# Patient Record
Sex: MALE | Race: WHITE | Employment: OTHER | URBAN - METROPOLITAN AREA
[De-identification: names, ages, dates, MRNs, and addresses within clinical notes are randomized per-mention and may not be internally consistent; named-entity substitution may affect disease eponyms.]

---

## 2019-07-09 ENCOUNTER — HOSPITAL ENCOUNTER (OUTPATIENT)
Age: 84
Setting detail: OBSERVATION
Discharge: HOME | End: 2019-07-10
Attending: EMERGENCY MEDICINE | Admitting: INTERNAL MEDICINE
Payer: COMMERCIAL

## 2019-07-09 ENCOUNTER — APPOINTMENT (OUTPATIENT)
Dept: GENERAL RADIOLOGY | Age: 84
End: 2019-07-09
Payer: COMMERCIAL

## 2019-07-09 ENCOUNTER — APPOINTMENT (OUTPATIENT)
Dept: CT IMAGING | Age: 84
End: 2019-07-09
Payer: COMMERCIAL

## 2019-07-09 DIAGNOSIS — S70.01XA CONTUSION OF RIGHT HIP, INITIAL ENCOUNTER: ICD-10-CM

## 2019-07-09 DIAGNOSIS — R26.81 GAIT INSTABILITY: ICD-10-CM

## 2019-07-09 DIAGNOSIS — R03.0 ELEVATED BLOOD PRESSURE READING: ICD-10-CM
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
## 2019-07-09 PROBLEM — R54 AGE-RELATED PHYSICAL DEBILITY: Status: ACTIVE | Noted: 2019-07-09

## 2019-07-09 LAB
A/G RATIO: 1.5 (ref 1.1–2.2)
ALBUMIN SERPL-MCNC: 4.5 G/DL (ref 3.4–5)
ALP BLD-CCNC: 85 U/L (ref 40–129)
ALT SERPL-CCNC: 13 U/L (ref 10–40)
ANION GAP SERPL CALCULATED.3IONS-SCNC: 11 MMOL/L (ref 3–16)
APTT: 29.1 SEC (ref 26–36)
AST SERPL-CCNC: 17 U/L (ref 15–37)
BASOPHILS ABSOLUTE: 0 K/UL (ref 0–0.2)
BASOPHILS RELATIVE PERCENT: 0.6 %
BILIRUB SERPL-MCNC: 0.7 MG/DL (ref 0–1)
BILIRUBIN URINE: NEGATIVE
BLOOD, URINE: NEGATIVE
BUN BLDV-MCNC: 15 MG/DL (ref 7–20)
CALCIUM SERPL-MCNC: 9.8 MG/DL (ref 8.3–10.6)
CHLORIDE BLD-SCNC: 103 MMOL/L (ref 99–110)
CLARITY: CLEAR
CO2: 27 MMOL/L (ref 21–32)
COLOR: YELLOW
CREAT SERPL-MCNC: 1.1 MG/DL (ref 0.8–1.3)
EKG ATRIAL RATE: 64 BPM
EKG DIAGNOSIS: NORMAL
EKG P AXIS: 76 DEGREES
EKG P-R INTERVAL: 250 MS
EKG Q-T INTERVAL: 406 MS
EKG QRS DURATION: 96 MS
EKG QTC CALCULATION (BAZETT): 418 MS
EKG R AXIS: 34 DEGREES
EKG T AXIS: 62 DEGREES
EKG VENTRICULAR RATE: 64 BPM
EOSINOPHILS ABSOLUTE: 0.2 K/UL (ref 0–0.6)
EOSINOPHILS RELATIVE PERCENT: 2.8 %
GFR AFRICAN AMERICAN: >60
GFR NON-AFRICAN AMERICAN: >60
GLOBULIN: 3 G/DL
GLUCOSE BLD-MCNC: 135 MG/DL (ref 70–99)
GLUCOSE URINE: NEGATIVE MG/DL
HCT VFR BLD CALC: 43.4 % (ref 40.5–52.5)
HEMOGLOBIN: 14.5 G/DL (ref 13.5–17.5)
INR BLD: 1.08 (ref 0.86–1.14)
KETONES, URINE: NEGATIVE MG/DL
LEUKOCYTE ESTERASE, URINE: NEGATIVE
LYMPHOCYTES ABSOLUTE: 1 K/UL (ref 1–5.1)
LYMPHOCYTES RELATIVE PERCENT: 18.4 %
MCH RBC QN AUTO: 30 PG (ref 26–34)
MCHC RBC AUTO-ENTMCNC: 33.5 G/DL (ref 31–36)
MCV RBC AUTO: 89.5 FL (ref 80–100)
MICROSCOPIC EXAMINATION: NORMAL
MONOCYTES ABSOLUTE: 0.4 K/UL (ref 0–1.3)
MONOCYTES RELATIVE PERCENT: 7.2 %
NEUTROPHILS ABSOLUTE: 3.9 K/UL (ref 1.7–7.7)
NEUTROPHILS RELATIVE PERCENT: 71 %
NITRITE, URINE: NEGATIVE
PDW BLD-RTO: 13.8 % (ref 12.4–15.4)
PH UA: 7 (ref 5–8)
PLATELET # BLD: 153 K/UL (ref 135–450)
PMV BLD AUTO: 8.7 FL (ref 5–10.5)
POTASSIUM REFLEX MAGNESIUM: 4.3 MMOL/L (ref 3.5–5.1)
PROTEIN UA: NEGATIVE MG/DL
PROTHROMBIN TIME: 12.3 SEC (ref 9.8–13)
RBC # BLD: 4.85 M/UL (ref 4.2–5.9)
SODIUM BLD-SCNC: 141 MMOL/L (ref 136–145)
SPECIFIC GRAVITY UA: 1.01 (ref 1–1.03)
TOTAL CK: 74 U/L (ref 39–308)
TOTAL PROTEIN: 7.5 G/DL (ref 6.4–8.2)
URINE REFLEX TO CULTURE: NORMAL
URINE TYPE: NORMAL
UROBILINOGEN, URINE: 1 E.U./DL
WBC # BLD: 5.5 K/UL (ref 4–11)

## 2019-07-09 PROCEDURE — G0378 HOSPITAL OBSERVATION PER HR: HCPCS

## 2019-07-09 PROCEDURE — 85610 PROTHROMBIN TIME: CPT

## 2019-07-09 PROCEDURE — 70450 CT HEAD/BRAIN W/O DYE: CPT

## 2019-07-09 PROCEDURE — 80053 COMPREHEN METABOLIC PANEL: CPT

## 2019-07-09 PROCEDURE — 72125 CT NECK SPINE W/O DYE: CPT

## 2019-07-09 PROCEDURE — 96374 THER/PROPH/DIAG INJ IV PUSH: CPT

## 2019-07-09 PROCEDURE — 73502 X-RAY EXAM HIP UNI 2-3 VIEWS: CPT

## 2019-07-09 PROCEDURE — 2580000003 HC RX 258: Performed by: PHYSICIAN ASSISTANT

## 2019-07-09 PROCEDURE — 96361 HYDRATE IV INFUSION ADD-ON: CPT

## 2019-07-09 PROCEDURE — 72192 CT PELVIS W/O DYE: CPT

## 2019-07-09 PROCEDURE — 96360 HYDRATION IV INFUSION INIT: CPT

## 2019-07-09 PROCEDURE — 97530 THERAPEUTIC ACTIVITIES: CPT

## 2019-07-09 PROCEDURE — 97161 PT EVAL LOW COMPLEX 20 MIN: CPT

## 2019-07-09 PROCEDURE — 90471 IMMUNIZATION ADMIN: CPT | Performed by: PHYSICIAN ASSISTANT

## 2019-07-09 PROCEDURE — 85025 COMPLETE CBC W/AUTO DIFF WBC: CPT

## 2019-07-09 PROCEDURE — 72131 CT LUMBAR SPINE W/O DYE: CPT

## 2019-07-09 PROCEDURE — 93010 ELECTROCARDIOGRAM REPORT: CPT | Performed by: INTERNAL MEDICINE

## 2019-07-09 PROCEDURE — 99285 EMERGENCY DEPT VISIT HI MDM: CPT

## 2019-07-09 PROCEDURE — 90715 TDAP VACCINE 7 YRS/> IM: CPT | Performed by: PHYSICIAN ASSISTANT

## 2019-07-09 PROCEDURE — 93005 ELECTROCARDIOGRAM TRACING: CPT | Performed by: PHYSICIAN ASSISTANT

## 2019-07-09 PROCEDURE — 81003 URINALYSIS AUTO W/O SCOPE: CPT

## 2019-07-09 PROCEDURE — 82550 ASSAY OF CK (CPK): CPT

## 2019-07-09 PROCEDURE — 6360000002 HC RX W HCPCS: Performed by: PHYSICIAN ASSISTANT

## 2019-07-09 PROCEDURE — 97116 GAIT TRAINING THERAPY: CPT

## 2019-07-09 PROCEDURE — 85730 THROMBOPLASTIN TIME PARTIAL: CPT

## 2019-07-09 PROCEDURE — 71045 X-RAY EXAM CHEST 1 VIEW: CPT

## 2019-07-09 PROCEDURE — 2580000003 HC RX 258: Performed by: INTERNAL MEDICINE

## 2019-07-09 RX ORDER — SODIUM CHLORIDE 9 MG/ML
INJECTION, SOLUTION INTRAVENOUS CONTINUOUS
Status: DISPENSED | OUTPATIENT
Start: 2019-07-09 | End: 2019-07-10

## 2019-07-09 RX ORDER — ACETAMINOPHEN 325 MG/1
650 TABLET ORAL EVERY 4 HOURS PRN
Status: DISCONTINUED | OUTPATIENT
Start: 2019-07-09 | End: 2019-07-10 | Stop reason: HOSPADM

## 2019-07-09 RX ORDER — ONDANSETRON 2 MG/ML
4 INJECTION INTRAMUSCULAR; INTRAVENOUS EVERY 6 HOURS PRN
Status: DISCONTINUED | OUTPATIENT
Start: 2019-07-09 | End: 2019-07-10 | Stop reason: HOSPADM

## 2019-07-09 RX ORDER — 0.9 % SODIUM CHLORIDE 0.9 %
250 INTRAVENOUS SOLUTION INTRAVENOUS ONCE
Status: COMPLETED | OUTPATIENT
Start: 2019-07-09 | End: 2019-07-09

## 2019-07-09 RX ORDER — MAGNESIUM SULFATE 1 G/100ML
1 INJECTION INTRAVENOUS PRN
Status: DISCONTINUED | OUTPATIENT
Start: 2019-07-09 | End: 2019-07-10 | Stop reason: HOSPADM

## 2019-07-09 RX ORDER — POTASSIUM CHLORIDE 20 MEQ/1
40 TABLET, EXTENDED RELEASE ORAL PRN
Status: DISCONTINUED | OUTPATIENT
Start: 2019-07-09 | End: 2019-07-10 | Stop reason: HOSPADM

## 2019-07-09 RX ORDER — POTASSIUM CHLORIDE 7.45 MG/ML
10 INJECTION INTRAVENOUS PRN
Status: DISCONTINUED | OUTPATIENT
Start: 2019-07-09 | End: 2019-07-10 | Stop reason: HOSPADM

## 2019-07-09 RX ORDER — SODIUM CHLORIDE 0.9 % (FLUSH) 0.9 %
10 SYRINGE (ML) INJECTION PRN
Status: DISCONTINUED | OUTPATIENT
Start: 2019-07-09 | End: 2019-07-10 | Stop reason: HOSPADM

## 2019-07-09 RX ORDER — SODIUM CHLORIDE 0.9 % (FLUSH) 0.9 %
10 SYRINGE (ML) INJECTION EVERY 12 HOURS SCHEDULED
Status: DISCONTINUED | OUTPATIENT
Start: 2019-07-09 | End: 2019-07-10 | Stop reason: HOSPADM

## 2019-07-09 RX ADMIN — TETANUS TOXOID, REDUCED DIPHTHERIA TOXOID AND ACELLULAR PERTUSSIS VACCINE, ADSORBED 0.5 ML: 5; 2.5; 8; 8; 2.5 SUSPENSION INTRAMUSCULAR at 12:42

## 2019-07-09 RX ADMIN — SODIUM CHLORIDE: 9 INJECTION, SOLUTION INTRAVENOUS at 21:49

## 2019-07-09 RX ADMIN — SODIUM CHLORIDE 250 ML: 9 INJECTION, SOLUTION INTRAVENOUS at 12:41

## 2019-07-09 RX ADMIN — Medication 10 ML: at 21:49

## 2019-07-09 ASSESSMENT — PAIN SCALES - GENERAL
PAINLEVEL_OUTOF10: 0
PAINLEVEL_OUTOF10: 4

## 2019-07-09 ASSESSMENT — PAIN DESCRIPTION - PAIN TYPE: TYPE: ACUTE PAIN

## 2019-07-09 ASSESSMENT — PAIN DESCRIPTION - LOCATION: LOCATION: BACK

## 2019-07-09 NOTE — ED PROVIDER NOTES
[facility]  eMERGENCY dEPARTMENT eNCOUnter        Pt Name: Pat Fang  MRN: [id number]  Tashigffrancisca 9/5/1928  Date of evaluation: 7/9/2019  Provider: Aurea Vance PA-C  PCP: No primary care provider on file. ED Attending: Nicole Romero. Anastasia Landaverde MD    CHIEF COMPLAINT       Chief Complaint   Patient presents with   [employer] Meals     was outside fell and was crawling around hollaring for help for about 20min neighbor called ems, pt is unable to stand c/o right hip pain (denies hitting head or passing out)       HISTORY OF PRESENT ILLNESS   (Location/Symptom, Timing/Onset, Context/Setting, Quality, Duration, Modifying Factors, Severity)  Note limiting factors. Pat Fang is a 80 y.o. male via EMS, for evaluation after a mechanical fall on a wet surface at his daughter's house. Indicates that he fell hitting his right posterior hip was unable to get back up. Describes aching throbbing 4 out of 10 pain to the right hip. Patient denies hitting his head or any loss of consciousness. Patient cannot weight-bear onset of symptoms was sudden 1 hour prior to arrival duration is been to the present time. No alleviating factors attempted movement and use makes the pain worse. Patient is not taking any pain medication. Patient does not take any medications on a regular basis. Nursing Notes were all reviewed and agreed with or any disagreements were addressed  in the HPI. REVIEW OF SYSTEMS  (2-9 systems for level 4, 10 or more for level 5)     Review of Systems   Constitutional: Negative for chills and fever. Respiratory: Negative for cough and shortness of breath. Cardiovascular: Negative for chest pain and palpitations. Gastrointestinal: Negative for nausea and vomiting. Genitourinary: Negative for difficulty urinating, dysuria and frequency. Musculoskeletal: Positive for arthralgias, gait problem and myalgias. All other systems reviewed and are negative.       Positivesand Spontaneous  Best Verbal Response: Oriented  Best Motor Response: Obeys commands  Madison Coma Scale Score: 15        PHYSICAL EXAM    (up to 7 for level 4, 8 ormore for level 5)     ED Triage Vitals   BP Temp Temp Source Pulse Resp SpO2 Height Weight   07/09/19 1156 07/09/19 1154 07/09/19 1154 07/09/19 1154 07/09/19 1154 07/09/19 1154 07/09/19 1154 --   (!) 170/77 97.9 °F (36.6 °C) Oral 73 12 97 % 6' (1.829 m)        Physical Exam   Constitutional: He is oriented to person, place, and time. He appears well-developed and well-nourished. No distress. HENT:   Head: Normocephalic and atraumatic. Eyes: Pupils are equal, round, and reactive to light. EOM are normal. Right eye exhibits no discharge. Left eye exhibits no discharge. Neck: Normal range of motion. Neck supple. Cardiovascular: Normal rate, regular rhythm, normal heart sounds and intact distal pulses. Pulmonary/Chest: Effort normal. No respiratory distress. Abdominal: Soft. There is no tenderness. Musculoskeletal: Normal range of motion. Neurological: He is alert and oriented to person, place, and time. Moderate tremor to right side   Skin: Skin is warm and dry. He is not diaphoretic. No pallor. Psychiatric: He has a normal mood and affect. His behavior is normal.   Nursing note and vitals reviewed.       DIAGNOSTIC RESULTS   LABS:    Labs Reviewed   COMPREHENSIVE METABOLIC PANEL W/ REFLEX TO MG FOR LOW K - Abnormal; Notable for the following components:       Result Value    Glucose 135 (*)     All other components within normal limits    Narrative:     Performed at:  [facility] ([facility]) - [facility] [address],  [place], [facility]   Phone [phone]   BASIC METABOLIC PANEL W/ REFLEX TO MG FOR LOW K - Abnormal; Notable for the following components:    Glucose 103 (*)     All other components within normal limits    Narrative:     Performed at:  [facility] ([facility]) - [facility], [place], [facility]   Phone [phone]   CBC WITH AUTO DIFFERENTIAL    Narrative:     Performed at:  [facility]) - [facility] [address],  [place], [facility]   Phone [phone]   URINE RT REFLEX TO CULTURE    Narrative:     Performed at:  [facility] [address],  [place], [facility]   Phone [phone]   CK    Narrative:     Performed at:  [facility] [address],  [place], [facility]   Phone [phone]   PROTIME-INR    Narrative:     Performed at:  [place] [address],  [place], [facility]   Phone [phone]   APTT    Narrative:     Performed at:  [facility]) - [facility] [address],  [place], [facility]   Phone [phone]       All other labs were within normal range or notreturned as of this dictation. EKG: All EKG's are interpreted by the Emergency Department Physician who either signs or Co-signs this chart in the absence of a cardiologist.  Please see their note for interpretation of EKG. RADIOLOGY:         Interpretation per the Radiologist below, if available at the time of this note:    CT LUMBAR SPINE WO CONTRAST   Final Result   No acute spine abnormality. Degenerative findings similar to prior MRI 4.5 years earlier. CT PELVIS WO CONTRAST Additional Contrast? None   Final Result   No acute fracture of the sacrum, pelvis or visualized osseous structures is   identified. XR HIP RIGHT (2-3 VIEWS)   Final Result   No acute bone or joint abnormality. XR CHEST PORTABLE   Final Result   No acute cardiopulmonary disease is identified. CT Cervical Spine WO Contrast   Final Result   No acute spine abnormality. Moderate degenerative changes greatest at C5-6 with mild canal and moderate   foraminal stenosis greater to the right.          CT Head WO Contrast   Final Result No acute intracranial abnormality. Atrophy and low density white matter likely from chronic small vessel   disease. .           Ct Head Wo Contrast    Result Date: 7/9/2019  EXAMINATION: CT OF THE HEAD WITHOUT CONTRAST  7/9/2019 12:29 pm TECHNIQUE: CT of the head was performed without the administration of intravenous contrast. Dose modulation, iterative reconstruction, and/or weight based adjustment of the mA/kV was utilized to reduce the radiation dose to as low as reasonably achievable. COMPARISON: None. HISTORY: ORDERING SYSTEM PROVIDED HISTORY: fall denies LOC TECHNOLOGIST PROVIDED HISTORY: Has a \"code stroke\" or \"stroke alert\" been called? ->No Reason for Exam: slipped and fell Acuity: Acute Type of Exam: Initial FINDINGS: BRAIN/VENTRICLES: 1. Ventricles and sulci: Enlarged but appropriate for age. 2. Cerebrum: No hemorrhage, mass or acute infarction. 3. White matter: Moderate abnormal confluent low density is seen throughout the periventricular white matter. 4. Brainstem and cerebellum: Normal for age. ORBITS: The visualized portion of the orbits demonstrate no acute abnormality. SINUSES: The visualized paranasal sinuses demonstrate no acute abnormality. Mastoids are unremarkable. SOFT TISSUES/SKULL:  No acute abnormality of the visualized skull or soft tissues. No acute intracranial abnormality. Atrophy and low density white matter likely from chronic small vessel disease. .     Ct Cervical Spine Wo Contrast    Result Date: 7/9/2019  EXAMINATION: CT OF THE CERVICAL SPINE WITHOUT CONTRAST 7/9/2019 12:29 pm TECHNIQUE: CT of the cervical spine was performed without the administration of intravenous contrast. Multiplanar reformatted images are provided for review. Dose modulation, iterative reconstruction, and/or weight based adjustment of the mA/kV was utilized to reduce the radiation dose to as low as reasonably achievable.  COMPARISON: Cervical MRI 02/21/2015 HISTORY: ORDERING SYSTEM PROVIDED HISTORY: admission so we can get his home care set up.     [AR]   [phone] hospitalist for admission, dr Edward Thompson, agrees to admit. [AR]      ED Course User Index  [AR] Sarah Pimentel PA-C     Labs ordered and evaluated in addition to CT. CT imaging shows the patient has no acute fractures. No acute head injury Kyle. He is given IV fluids. He is feeling improved. We have no indication for admission he is seen by physical therapy who recommends the patient have home care however it does not feel that he needs to be admitted case management assess the patient and recommended a life alert bracelet splint at home care services. I made numerous phone calls to try to reach patient's daughter and son and am unable to patient is trying to allow us to send him home in a cab however I do not feel this is appropriate and feel that we should at least contact 1 of his family members prior to letting him be discharged. I discussed this case with current attending provider Dr. Archie Calvin who is familiar. All questions are answered. The patient tolerated their visit well. They were seen and evaluated by the [facility]. Yolis Contreras MD who agreed with the assessment and plan. The patient and / or the family were informed of the results of any tests, a time was given to answer questions, a plan was proposed and they agreed Lianet Aguirre. FINAL IMPRESSION      1. Fall, initial encounter    2. Contusion of right hip, initial encounter    3. Elevated blood pressure reading    4. Gait instability          DISPOSITION/PLAN   DISPOSITION    Admit in stable condition    PATIENT REFERRED TO:  Margareth Tejada MD  [facility] [address] [facility] [address]  [phone]    Schedule an appointment as soon as possible for a visit   for a recheck in 1-2  days           (Please note that portions of this note were completed with a voice recognition program.  Efforts were made to edit the dictations but occasionally words are

## 2019-07-09 NOTE — ED NOTES
Bed: 11  Expected date:   Expected time:   Means of arrival:   Comments:  Carlitos Wells  07/09/19 1159

## 2019-07-09 NOTE — ED NOTES
Face sheet printed out and placed at [address] desk for Case Management.       John Goyal  07/09/19 7332

## 2019-07-09 NOTE — ED NOTES
[address] Alicia Phillips paged through perfect serve  1640 Dr. Alicia Phillips returned call to [facility], [address]  07/09/19 9712

## 2019-07-09 NOTE — ED NOTES
This nurse attempted to call Teja jim at this time with no answer.       Cathy Novak RN  07/09/19 2367

## 2019-07-10 VITALS
HEART RATE: 62 BPM | OXYGEN SATURATION: 96 % | RESPIRATION RATE: 16 BRPM | WEIGHT: 164.06 LBS | HEIGHT: 70 IN | BODY MASS INDEX: 23.49 KG/M2 | DIASTOLIC BLOOD PRESSURE: 70 MMHG | TEMPERATURE: 97.1 F | SYSTOLIC BLOOD PRESSURE: 140 MMHG

## 2019-07-10 PROBLEM — R54 AGE-RELATED PHYSICAL DEBILITY: Status: RESOLVED | Noted: 2019-07-09 | Resolved: 2019-07-10

## 2019-07-10 LAB
ANION GAP SERPL CALCULATED.3IONS-SCNC: 8 MMOL/L (ref 3–16)
BUN BLDV-MCNC: 11 MG/DL (ref 7–20)
CALCIUM SERPL-MCNC: 9 MG/DL (ref 8.3–10.6)
CHLORIDE BLD-SCNC: 104 MMOL/L (ref 99–110)
CO2: 28 MMOL/L (ref 21–32)
CREAT SERPL-MCNC: 1 MG/DL (ref 0.8–1.3)
GFR AFRICAN AMERICAN: >60
GFR NON-AFRICAN AMERICAN: >60
GLUCOSE BLD-MCNC: 103 MG/DL (ref 70–99)
POTASSIUM REFLEX MAGNESIUM: 4.1 MMOL/L (ref 3.5–5.1)
SODIUM BLD-SCNC: 140 MMOL/L (ref 136–145)

## 2019-07-10 PROCEDURE — 2580000003 HC RX 258: Performed by: INTERNAL MEDICINE

## 2019-07-10 PROCEDURE — G0378 HOSPITAL OBSERVATION PER HR: HCPCS

## 2019-07-10 PROCEDURE — 97166 OT EVAL MOD COMPLEX 45 MIN: CPT

## 2019-07-10 PROCEDURE — 97164 PT RE-EVAL EST PLAN CARE: CPT

## 2019-07-10 PROCEDURE — 6360000002 HC RX W HCPCS: Performed by: INTERNAL MEDICINE

## 2019-07-10 PROCEDURE — 97110 THERAPEUTIC EXERCISES: CPT

## 2019-07-10 PROCEDURE — 99220 PR INITIAL OBSERVATION CARE/DAY 70 MINUTES: CPT | Performed by: INTERNAL MEDICINE

## 2019-07-10 PROCEDURE — G0009 ADMIN PNEUMOCOCCAL VACCINE: HCPCS | Performed by: INTERNAL MEDICINE

## 2019-07-10 PROCEDURE — 97116 GAIT TRAINING THERAPY: CPT

## 2019-07-10 PROCEDURE — 97535 SELF CARE MNGMENT TRAINING: CPT

## 2019-07-10 PROCEDURE — 36415 COLL VENOUS BLD VENIPUNCTURE: CPT

## 2019-07-10 PROCEDURE — 90670 PCV13 VACCINE IM: CPT | Performed by: INTERNAL MEDICINE

## 2019-07-10 PROCEDURE — 80048 BASIC METABOLIC PNL TOTAL CA: CPT

## 2019-07-10 PROCEDURE — 96372 THER/PROPH/DIAG INJ SC/IM: CPT

## 2019-07-10 RX ADMIN — PNEUMOCOCCAL 13-VALENT CONJUGATE VACCINE 0.5 ML: 2.2; 2.2; 2.2; 2.2; 2.2; 4.4; 2.2; 2.2; 2.2; 2.2; 2.2; 2.2; 2.2 INJECTION, SUSPENSION INTRAMUSCULAR at 08:51

## 2019-07-10 RX ADMIN — Medication 10 ML: at 08:53

## 2019-07-10 RX ADMIN — ENOXAPARIN SODIUM 40 MG: 40 INJECTION SUBCUTANEOUS at 08:50

## 2019-07-10 NOTE — H&P
APTT 29.1     UA:  Recent Labs     07/09/19  1350   COLORU Yellow   PHUR 7.0   CLARITYU Clear   SPECGRAV 1.015   LEUKOCYTESUR Negative   UROBILINOGEN 1.0   BILIRUBINUR Negative   BLOODU Negative   GLUCOSEU Negative          CARDIAC ENZYMES  Recent Labs     07/09/19  1200   CKTOTAL 74       U/A:    Lab Results   Component Value Date    COLORU Yellow 07/09/2019    CLARITYU Clear 07/09/2019    SPECGRAV 1.015 07/09/2019    LEUKOCYTESUR Negative 07/09/2019    BLOODU Negative 07/09/2019    GLUCOSEU Negative 07/09/2019       EKG:  I have reviewed the EKG with the following interpretation:   SR with 1st degree AV block     RADIOLOGY  CT LUMBAR SPINE WO CONTRAST   Final Result   No acute spine abnormality. Degenerative findings similar to prior MRI 4.5 years earlier. CT PELVIS WO CONTRAST Additional Contrast? None   Final Result   No acute fracture of the sacrum, pelvis or visualized osseous structures is   identified. XR HIP RIGHT (2-3 VIEWS)   Final Result   No acute bone or joint abnormality. XR CHEST PORTABLE   Final Result   No acute cardiopulmonary disease is identified. CT Cervical Spine WO Contrast   Final Result   No acute spine abnormality. Moderate degenerative changes greatest at C5-6 with mild canal and moderate   foraminal stenosis greater to the right. CT Head WO Contrast   Final Result   No acute intracranial abnormality. Atrophy and low density white matter likely from chronic small vessel   disease. .             ASSESSMENT/PLAN:    #Mechanical fall  - due to fall on wet pavement at home. without evidence of fractures. Seen by PT/OT and able to ambulate with a cane without difficulty. Recommendations for home with supervision. #Prostate CA  - f/w Urology group    #Elevated blood pressure  - dtr states he was previously on BP meds at home but it has been several years ago.   His BP has been elevated since admission, could be situational. Repeated and 140/70 at time of discharge. Dtr will monitor with home cuff and make appt with a PCP to establish care     #Tremor  - dtr states has been present for several years    #Suspected dementia  -f/u PCP     DVT Prophylaxis: Lovenox   Diet: DIET GENERAL;  Code Status: Full Code    Disposition:  Dc to home with daughter. Establish care with PCP to follow up blood pressure       Medication List      You have not been prescribed any medications. Tayo Redding PA-C  7/10/2019 9:45 AM      Agree. D/c home in stable condition  ANUSHA Vang.

## 2019-07-10 NOTE — CARE COORDINATION
[facility]  Spoke with pt and daughter, Saumya Cody. Pt lives with daughter. Verified demographics. [address]  [phone]  [phone]    Pt and family are agreeable to [facility] for SN, PT/OT, MSW. S3 for safety. Pt does not currently have a PCP. Daughter states she will call her PCP, Dr. Angeli Busby, to set her father up as a new pt. Encouraged daughter to schedule NP appt ASAP. Advised to contact [facility] to see Dr. Rodriguez Presume until pt can establish with new PCP. Dr. Rodriguez Presume to follow for [facility]. needs. Daughter/pt aware pt will need to follow up with a physician after discharge from hospital for [facility]. services. Provided daughter with [facility] brochure and contact number. Faxed referral with orders for [facility] to [facility].     Electronically signed by Viet Sands RN on 7/10/2019 at 12:19 PM
[facility]    Referral received from CM to follow for home care services. I will follow for needs, and speak with patient to verify demos. Patient will need to be an S3 for front loading of visits as his daughter is out of the home from 7am until 6pm for work. CM Brain Mondragon will contact [facility] for additional services in the home.     [facility] for SN PT OT MSW on DC.      *[facility]*        Vijay Zamora  Work mobile: [phone]  [facility] office: [phone]
reviewed. Met with pt at bedside and explained the role of the CM. Consult for CM home care needs. Pt is awake and alert. He reports that he was walking on the side walk outside and slipped and fell. He was on the ground for about an hour before a neighbor heard him call in for help and called the ambulance. Ed was unable to reach family. PT? OT recommends home care/home safety assessment. SN and MSW added by CM after discussing safety concerns with Randolph PA.  Pt will be placed under observation and CM will follow

## 2019-07-10 NOTE — PROGRESS NOTES
Inpatient Occupational Therapy  Evaluation and Treatment    Unit: 2 [place]  Date:  7/10/2019  Patient Name:    Dimitry Fonseca  Admitting diagnosis:  Age-related physical debility [R54]  Admit Date:  7/9/2019  Precautions/Restrictions/WB Status/ Lines/ Wounds/ Oxygen: fall risk    Treatment Time:  820-900  Treatment Number: 1   Billable Treatment Time: 15 minutes   Total Treatment Time:   40  minutes    Patient Goals for Therapy:  \" go home \"      Discharge Recommendations: Home with 24/7 assist and home therapy  DME needs for discharge: [facility]- to use over commode        Therapy recommendations for staff:   Assist of 1 with use of Single point cane for all ambulation within halls      Home Health S4 Level Recommendation:  Level 1 Standard  AM-PAC Score: 21    Preadmission Environment    Pt. Lives                                  with family (daughter)    dtr works during the day however states other family can be with pt initally                        Home environment:                apartment   Steps to enter first floor:         2 steps to enter             Steps to second floor:             N/A, pt lives on main level- pt does go up stairs in the tri level house . The steps have bilateral hand rails  Bathroom:                               Walk-in Shower and [address] [place] [address] owned:                  [employer] and [employer] Walker     Preadmission Status / PLOF:  History of falls                                     Yes  Pt. Able to drive                                  No  Pt independent with ADLs                  Yes              Pt. Required assistance from family for:                            Cleaning, Cooking and Laundry     Pt. Fully independent for transfers and gait and walked with:                          Riccardo Richard in apartment, [employer] or walker when outside  Pt reports that his dtr owns and operates the apartment complex in which he resides.   Dtr is working from [employer].  Reports he has been using SW in
Inpatient Physical Therapy Re-Evaluation and Treatment    Unit: 2 [address]  Date:  7/10/2019  Patient Name:    Amari Molina  Admitting diagnosis:  Age-related physical debility [R54]  Admit Date:  7/9/2019  Precautions/Restrictions/WB Status/ Lines/ Wounds/ Oxygen: fall risk, IV and bed/chair alarm    Treatment Time:  0916-0946  Treatment Number:  1   Timed Code Treatment Minutes: 20 minutes  Total Treatment Minutes:  30  minutes    Patient Goals for Therapy: \" Go home \"          Discharge Recommendations: Home with 24/7 assist and home therapy  DME needs for discharge: Needs Met       Therapy recommendation for EMS Transport: NA    Therapy recommendations for staff:   Assist of 1 with use of Single point cane for all transfers and ambulation to/from bathroom    Home Health S4 Level Recommendation:  Level 3 Safety  AM-PAC Mobility Score    AM-PAC Inpatient Mobility Raw Score : 19       Preadmission Environment  Information verified by pt's dtr due to pt confusion  Pt. Lives                                  with family (daughter)  - dtr reports ability to provide 24/7 assist for short period                           Home environment:                Tri-level home  Steps to enter first floor:         Level entry to bedroom on bottom level             Steps to second floor:             5-6 step with bilateral HR to kitchen  Bathroom:                               Tub shower and [address] [facility] owned:                  [facility]      Preadmission Status / PLOF:  History of falls                                     Yes - fall outside with slipping on wet surface  Pt. Able to drive                                  No  Pt independent with ADLs                  Yes  Pt. Required assistance from family for:   Cleaning, Cooking and Laundry     Pt.  Fully independent for transfers and gait and walked with: SPC     Pain   No  Location:   Rating: NA   Pain Medicine Status: Denies need    Cognition    A&O Person and Place ,
transfers and gait and walked with:    Emely Martinez in apartment, [facility] or walker when outside    Pt reports that his dtr owns and operates the apartment complex in which he resides. Dtr is working from [employer].  Reports he has been using SW in apartment for the past two weeks. Pt states he uses it primarily to keep the dogs from tripping him. OBJECTIVE FINDINGS    Upper Extremity ROM/Strength  BUE ROM WFL     Lower Extremity ROM / Strength (Ratings on a 5/5 scale)    AROM: WFL    Right LE  quads    3+                                       ant tibs  4-                                                hams          4-                                               iliopsoas   3                  LEFT LE   quads     4     ant tibs       4     hams         4    iliopsoas    3  Sensation       Light touch:     WFL  Location:    N/A  Proprioception:   WFL  Location:      N/A    Balance  Static Sitting:  Good   Dynamic Sitting: Fair +  Static Standing:  Fair +  Dynamic Standing: Fair +     Balance Functional Outcome Measure   NA  Measure Used:  N/A  Date Assessed:  Score:     Indication for Rhomberg: postive test = proprioceptive deficit  Indications for ROCHE: Risk of Falls []41-55 = low, []21-40 = medium, []0-20 = high   Indications for Tinetti: Risk of Falls:   []Low (> 24)   []Mod (19-23)   []High (< 18)  Indications for DGI:  [] minimal to no risk (> 21), [] risk of falls (<21)    Bed mobility    Rolling to left side SBA   Rolling to right side Not Tested   Scooting in bed Not Tested    Supine to sit Min A, assist with trunk   Sit to supine Min A, assist with RLE     Transfers    Sit to stand Min A   Stand to sit CGA    Bed to chair  Not Tested   Toilet transfer  Not Tested     Gait  Deviations (firm surface/linoleum):    decreased josé miguel, Decreased step length on R, Decreased step length on L, Shuffles, narrow QUEENIE and forward flexed posture   Assistive Device Used:  Single point cane  Level of Assist: CGA/SBA  Distance:  50

## 2019-07-12 ASSESSMENT — ENCOUNTER SYMPTOMS
VOMITING: 0
SHORTNESS OF BREATH: 0
COUGH: 0
NAUSEA: 0